# Patient Record
Sex: FEMALE | Race: BLACK OR AFRICAN AMERICAN | Employment: FULL TIME | URBAN - METROPOLITAN AREA
[De-identification: names, ages, dates, MRNs, and addresses within clinical notes are randomized per-mention and may not be internally consistent; named-entity substitution may affect disease eponyms.]

---

## 2019-01-10 ENCOUNTER — APPOINTMENT (OUTPATIENT)
Dept: GENERAL RADIOLOGY | Age: 39
End: 2019-01-10
Attending: EMERGENCY MEDICINE
Payer: COMMERCIAL

## 2019-01-10 ENCOUNTER — HOSPITAL ENCOUNTER (EMERGENCY)
Age: 39
Discharge: HOME | End: 2019-01-10
Attending: EMERGENCY MEDICINE | Admitting: EMERGENCY MEDICINE
Payer: COMMERCIAL

## 2019-01-10 VITALS
HEART RATE: 81 BPM | SYSTOLIC BLOOD PRESSURE: 128 MMHG | WEIGHT: 145 LBS | BODY MASS INDEX: 22.76 KG/M2 | OXYGEN SATURATION: 100 % | HEIGHT: 67 IN | RESPIRATION RATE: 16 BRPM | DIASTOLIC BLOOD PRESSURE: 74 MMHG | TEMPERATURE: 98.4 F

## 2019-01-10 DIAGNOSIS — R07.9 ACUTE CHEST PAIN: Primary | ICD-10-CM

## 2019-01-10 LAB
ALBUMIN SERPL-MCNC: 3.7 G/DL (ref 3.5–5)
ALBUMIN/GLOB SERPL: 0.8 {RATIO} (ref 1.1–2.2)
ALP SERPL-CCNC: 39 U/L (ref 45–117)
ALT SERPL-CCNC: 14 U/L (ref 12–78)
ANION GAP SERPL CALC-SCNC: 8 MMOL/L (ref 5–15)
AST SERPL-CCNC: 16 U/L (ref 15–37)
BASOPHILS # BLD: 0 K/UL (ref 0–0.1)
BASOPHILS NFR BLD: 1 % (ref 0–1)
BILIRUB SERPL-MCNC: 0.6 MG/DL (ref 0.2–1)
BUN SERPL-MCNC: 8 MG/DL (ref 6–20)
BUN/CREAT SERPL: 7 (ref 12–20)
CALCIUM SERPL-MCNC: 9.2 MG/DL (ref 8.5–10.1)
CHLORIDE SERPL-SCNC: 104 MMOL/L (ref 97–108)
CK SERPL-CCNC: 111 U/L (ref 26–192)
CO2 SERPL-SCNC: 26 MMOL/L (ref 21–32)
CREAT SERPL-MCNC: 1.14 MG/DL (ref 0.55–1.02)
D DIMER PPP FEU-MCNC: 0.19 MG/L FEU (ref 0–0.65)
DIFFERENTIAL METHOD BLD: ABNORMAL
EOSINOPHIL # BLD: 0 K/UL (ref 0–0.4)
EOSINOPHIL NFR BLD: 1 % (ref 0–7)
ERYTHROCYTE [DISTWIDTH] IN BLOOD BY AUTOMATED COUNT: 12.5 % (ref 11.5–14.5)
GLOBULIN SER CALC-MCNC: 4.4 G/DL (ref 2–4)
GLUCOSE SERPL-MCNC: 89 MG/DL (ref 65–100)
HCT VFR BLD AUTO: 39 % (ref 35–47)
HGB BLD-MCNC: 13 G/DL (ref 11.5–16)
IMM GRANULOCYTES # BLD AUTO: 0 K/UL (ref 0–0.04)
IMM GRANULOCYTES NFR BLD AUTO: 0 % (ref 0–0.5)
LYMPHOCYTES # BLD: 2.2 K/UL (ref 0.8–3.5)
LYMPHOCYTES NFR BLD: 59 % (ref 12–49)
MCH RBC QN AUTO: 30.1 PG (ref 26–34)
MCHC RBC AUTO-ENTMCNC: 33.3 G/DL (ref 30–36.5)
MCV RBC AUTO: 90.3 FL (ref 80–99)
MONOCYTES # BLD: 0.3 K/UL (ref 0–1)
MONOCYTES NFR BLD: 8 % (ref 5–13)
NEUTS SEG # BLD: 1.1 K/UL (ref 1.8–8)
NEUTS SEG NFR BLD: 31 % (ref 32–75)
NRBC # BLD: 0 K/UL (ref 0–0.01)
NRBC BLD-RTO: 0 PER 100 WBC
PLATELET # BLD AUTO: 257 K/UL (ref 150–400)
PMV BLD AUTO: 10.5 FL (ref 8.9–12.9)
POTASSIUM SERPL-SCNC: 4.3 MMOL/L (ref 3.5–5.1)
PROT SERPL-MCNC: 8.1 G/DL (ref 6.4–8.2)
RBC # BLD AUTO: 4.32 M/UL (ref 3.8–5.2)
SODIUM SERPL-SCNC: 138 MMOL/L (ref 136–145)
TROPONIN I SERPL-MCNC: <0.05 NG/ML
TROPONIN I SERPL-MCNC: <0.05 NG/ML
WBC # BLD AUTO: 3.7 K/UL (ref 3.6–11)

## 2019-01-10 PROCEDURE — 85379 FIBRIN DEGRADATION QUANT: CPT

## 2019-01-10 PROCEDURE — 93005 ELECTROCARDIOGRAM TRACING: CPT

## 2019-01-10 PROCEDURE — 99283 EMERGENCY DEPT VISIT LOW MDM: CPT

## 2019-01-10 PROCEDURE — 85025 COMPLETE CBC W/AUTO DIFF WBC: CPT

## 2019-01-10 PROCEDURE — 36415 COLL VENOUS BLD VENIPUNCTURE: CPT

## 2019-01-10 PROCEDURE — 74011250636 HC RX REV CODE- 250/636: Performed by: EMERGENCY MEDICINE

## 2019-01-10 PROCEDURE — 82550 ASSAY OF CK (CPK): CPT

## 2019-01-10 PROCEDURE — 84484 ASSAY OF TROPONIN QUANT: CPT

## 2019-01-10 PROCEDURE — 80053 COMPREHEN METABOLIC PANEL: CPT

## 2019-01-10 PROCEDURE — 96374 THER/PROPH/DIAG INJ IV PUSH: CPT

## 2019-01-10 PROCEDURE — 71046 X-RAY EXAM CHEST 2 VIEWS: CPT

## 2019-01-10 RX ORDER — LIDOCAINE 50 MG/G
PATCH TOPICAL
Qty: 7 EACH | Refills: 0 | Status: SHIPPED | OUTPATIENT
Start: 2019-01-10 | End: 2021-04-02

## 2019-01-10 RX ORDER — LORAZEPAM 2 MG/ML
0.5 INJECTION INTRAMUSCULAR
Status: COMPLETED | OUTPATIENT
Start: 2019-01-10 | End: 2019-01-10

## 2019-01-10 RX ORDER — CYCLOBENZAPRINE HCL 5 MG
10 TABLET ORAL
Qty: 20 TAB | Refills: 0 | Status: SHIPPED | OUTPATIENT
Start: 2019-01-10 | End: 2021-04-02

## 2019-01-10 RX ADMIN — LORAZEPAM 0.5 MG: 2 INJECTION INTRAMUSCULAR; INTRAVENOUS at 11:31

## 2019-01-10 NOTE — ED NOTES
Pt presents to the ED from triage c/o left sided chest pain onset 0830 today. Pt describes the pain as intermittent and sharp when the pain comes on. Pt denies any heavy lifting or change in daily routine. Pt denies sob, n/v, fever/chills, dizzy or any other complaints. Pt states pain is worse when moved a certain way or lifts arms

## 2019-01-10 NOTE — ED TRIAGE NOTES
Chest pain at 0830 post normal morning routine, Sitting makes worse, lifting left arm makes worse, SOB with pain, EMS on site and checked patient. On oral contraception.

## 2019-01-10 NOTE — LETTER
[address] 
[facility] EMERGENCY DEPT 
[address] [facility] [address] [id number] [phone] Work/School Note Date: 1/10/2019 To Whom It May concern: 
 
Camilo Houston was seen and treated today in the emergency room by the following provider(s): 
Attending Provider: Atul Coronel MD.   
 
Camilo Houston may return to work on 1/12/18.  
 
Sincerely, 
 
 
 
 
Nisa Metcalf MD

## 2019-01-10 NOTE — ED PROVIDER NOTES
41 y.o. female with no significant past medical history who presents from home with chief complaint of chest pain. Patient reports waking up feeling fine this morning and after taking a shower she has sudden onset of chest pain at 0830. Patient reports having intermittent, nonradiating left sided chest pain described as \"spasm\" that progressively worsened during the episode and rated as 8/10 pain, with accompanying SOB. Upon examination at [facility] ED patient endorses constant chest discomfort, but states the \"spasms\" have since resolved. Patient reports aggravation of left sided chest pain with pleuritic movement, \"moving a certain way\", and positional movement of the left arm. Patient on oral contraception. Patient reports history of chest pain described as \"spasm\" presented today, but states today's symptoms are more severe in nature than previously. Patient denies recent heavy lifting. Patient denies any chronic health issues. Pt denies fever, chills, cough, congestion, shortness of breath, abdominal pain, nausea, vomiting, diarrhea, difficulty with urination or dysuria. There are no other acute medical concerns at this time. Note written by Kenyetta Mccarty, as dictated by Earl Bo MD 9:49 AM 
 
 
 
 
  
 
History reviewed. No pertinent past medical history. Past Surgical History:  
Procedure Laterality Date  HX  SECTION    
 HX GYN History reviewed. No pertinent family history. Social History Socioeconomic History  Marital status: Not on file Spouse name: Not on file  Number of children: Not on file  Years of education: Not on file  Highest education level: Not on file Social Needs  Financial resource strain: Not on file  Food insecurity - worry: Not on file  Food insecurity - inability: Not on file  Transportation needs - medical: Not on file  Transportation needs - non-medical: Not on file Occupational History  Not on file Tobacco Use  Smoking status: Never Smoker  Smokeless tobacco: Never Used Substance and Sexual Activity  Alcohol use: No  
  Frequency: Never  Drug use: No  
 Sexual activity: Yes  
  Partners: Male Other Topics Concern  Not on file Social History Narrative  Not on file ALLERGIES: Patient has no known allergies. Review of Systems Constitutional: Negative for chills and fever. HENT: Negative for congestion, rhinorrhea and sore throat. Respiratory: Negative for cough and shortness of breath. Cardiovascular: Positive for chest pain. Gastrointestinal: Negative for abdominal pain, diarrhea, nausea and vomiting. Genitourinary: Negative for difficulty urinating, dysuria and urgency. Musculoskeletal: Negative for arthralgias and back pain. Skin: Negative for rash. Neurological: Negative for dizziness, weakness and light-headedness. All other systems reviewed and are negative. Vitals:  
 01/10/19 0142 BP: 122/79 Pulse: 82 Resp: 14 Temp: 98.4 °F (36.9 °C) SpO2: 100% Weight: 65.8 kg (145 lb) Height: 5' 7\" (1.702 m) Physical Exam  
Vital signs reviewed. Nursing notes reviewed. Const:  No acute distress, well developed, well nourished Head:  Atraumatic, normocephalic Eyes:  PERRL, conjunctiva normal, no scleral icterus Neck:  Supple, trachea midline Cardiovascular:  RRR, no murmurs, no gallops, no rubs Resp:  No resp distress, no increased work of breathing, no wheezes, no rhonchi, no rales, Abd:  Soft, non-tender, non-distended, no rebound, no guarding, no CVA tenderness :  Deferred MSK:  No pedal edema, normal ROM Neuro:  Alert and oriented x3, no cranial nerve defect Skin:  Warm, dry, intact Psych: normal mood and affect, behavior is normal, judgement and thought content is normal 
Note written by Kenyetta Luna, as dictated by Rachel Ray MD 9:50 AM 
 
MDM Number of Diagnoses or Management Options Acute chest pain:  
  
Amount and/or Complexity of Data Reviewed Clinical lab tests: ordered and reviewed Tests in the radiology section of CPT®: ordered and reviewed Review and summarize past medical records: yes Patient Progress Patient progress: stable Pt. Presents to the ER with sx consistent with MSK chest pain. Negative d-dimer. Negative cardiac enzymes. I will start her on flexeril and a lidoderm patch. Pt. To f/u with her PCP or return to the ER with worsening sx. Procedures

## 2019-01-10 NOTE — DISCHARGE INSTRUCTIONS

## 2019-01-11 LAB
ATRIAL RATE: 76 BPM
CALCULATED P AXIS, ECG09: 87 DEGREES
CALCULATED R AXIS, ECG10: 56 DEGREES
CALCULATED T AXIS, ECG11: 54 DEGREES
DIAGNOSIS, 93000: NORMAL
P-R INTERVAL, ECG05: 156 MS
Q-T INTERVAL, ECG07: 382 MS
QRS DURATION, ECG06: 84 MS
QTC CALCULATION (BEZET), ECG08: 429 MS
VENTRICULAR RATE, ECG03: 76 BPM

## 2021-04-02 ENCOUNTER — VIRTUAL VISIT (OUTPATIENT)
Dept: FAMILY MEDICINE CLINIC | Age: 41
End: 2021-04-02
Payer: COMMERCIAL

## 2021-04-02 DIAGNOSIS — K59.09 CHRONIC CONSTIPATION: ICD-10-CM

## 2021-04-02 DIAGNOSIS — R61 NIGHT SWEATS: ICD-10-CM

## 2021-04-02 DIAGNOSIS — K64.9 HEMORRHOIDS, UNSPECIFIED HEMORRHOID TYPE: ICD-10-CM

## 2021-04-02 DIAGNOSIS — H52.10 MYOPIA, UNSPECIFIED LATERALITY: ICD-10-CM

## 2021-04-02 DIAGNOSIS — Z76.89 ENCOUNTER TO ESTABLISH CARE: Primary | ICD-10-CM

## 2021-04-02 DIAGNOSIS — Z80.41 FAMILY HISTORY OF OVARIAN CANCER: ICD-10-CM

## 2021-04-02 PROCEDURE — 99203 OFFICE O/P NEW LOW 30 MIN: CPT | Performed by: FAMILY MEDICINE

## 2021-04-02 NOTE — PROGRESS NOTES
[id number] [address] Note      Assessment/ Plan:   Diagnoses and all orders for this visit:    1. Encounter to establish care    2. Night sweats    3. Family history of ovarian cancer    4. Chronic constipation    5. Hemorrhoids, unspecified hemorrhoid type    6. Myopia, unspecified laterality      Recommendations based on history, physical exam and review of pertinent labs, studies and medications:   Night sweats, subacute occurring more than 1 month nightly. Need in office examination with vitals. Next visit consider labs CBC, TSH, HIV, CRP, LFT, urinalysis, FSH  Family family history of ovarian cancer. Advised patient discuss early screening mammography with GYN and insurance. Chronic constipation with reported hemorrhoids. Diet modification recommended for increasing natural sources of fiber to promote regular bowel movements. May add OTC stool softener or laxative as needed. Follow up with specialists per routine. We discussed the expected course, resolution and complications of the diagnosis(es) in detail. Medication risks, benefits, costs, interactions, and alternatives were discussed as indicated. I advised her to contact the office if her condition worsens, changes or fails to improve as anticipated. She expressed understanding with the diagnosis(es) and plan. Thomas Woods is a 47 y.o. female being evaluated by a video visit encounter for concerns as above. A caregiver was present when appropriate. Due to this being a TeleHealth encounter (During ACEGU-60 public health emergency), evaluation of the following organ systems was limited: Vitals/Constitutional/EENT/Resp/CV/GI//MS/Neuro/Skin/Heme-Lymph-Imm.   Pursuant to the emergency declaration under the [facility], 1135 waiver authority and the [employer] and [employer] Act, this Virtual  Visit was conducted, with patient's (and/or legal guardian's) consent, to reduce the patient's risk of exposure to COVID-19 and provide necessary medical care. Services were provided through a video synchronous discussion virtually to substitute for in-person clinic visit. Provider was in the office while conducting this encounter. Patient was at home during encounter. Other persons participating in call: None  Consent:  She and/or her healthcare decision maker is aware that this patient-initiated Telehealth encounter is a billable service, with coverage as determined by her insurance carrier. She is aware that she may receive a bill and has provided verbal consent to proceed: Yes  This virtual visit was conducted via [employer]. Pursuant to the emergency declaration under the [facility], 1135 waiver authority and the [employer] and [employer] Act, this Virtual  Visit was conducted to reduce the patient's risk of exposure to COVID-19 and provide continuity of care for an established patient. Services were provided through a video synchronous discussion virtually to substitute for in-person clinic visit. Due to this being a TeleHealth evaluation, many elements of the physical examination are unable to be assessed. Total Time: 30     Follow-up and Dispositions    · Return in about 4 weeks (around 4/30/2021) for Follow Up in office for night sweats.        Subjective:     Chief Complaint   Patient presents with   [address] [place] [address] Angle Skates is a 47y.o. year old female who presents for evaluation of the following:      Family History of Cancer  Maternal Ovarian Caner age 47  Maternal aunt with breast cancer  No genetic testing done  Has had 1 mammogram in [place]    Night Sweats:   Onset > 1 monht  Character: shirt wet and needing to be changed  Nightly  Concern she may have thyroid issues  Endorses anxiety  Denies daytime sweating, heat intolerance, diarrhea, weight loss, rash, joint swelling     Constipation/ Hemorrhoids:  Chronic > 1 year  Diet: chicken, seafood, fish, shrimp, vegetables every meal, kidney bean  - Last 24 hours: chicken baked ziti (zitia pasta, tomatoes sauce, chicken breast, cheese). Seafood pasta (cheese shrimp janell, fettuccine, gallic bread)  Tx: fiber supplements     Nearsighted: Tx: wear glasses    Establishment of Care:  Patient Care Team:  None as PCP - General   Previous PCP: Name Unknown  GYN - [facility] [place]  Employment- works as  in [address]   Pertinent positives and negative per HPI. All other systems  reviewed are negative for a Comprehensive ROS (10+). Past medical history, social history, family history reviewed. Medications reconciled. Objective:   No flowsheet data found. Vitals measurement not available     Physical Examination:  General: Alert, cooperative, no distress, appears stated age. Eyes: Conjunctivae clear. Pupils equally round. Extraocular muscles intact. Ears: Normal appearing external ear   Nose: Nares normal appearing  Mouth/Throat: Lips, mucosa, and tongue normal. Moist mucous membranes. No tonsillar enlargement noted. Neck: Supple, symmetrical, trachea midline, no neck mass visualized. Respiratory: Breathing comfortably, in no acute respiratory distress. Cardiovascular: Visualized extremities without edema. MSK: Upper extremities normal appearing. Skin: No significant erythematous lesions or discoloration noted on facial skin. No rashes or lesions on exposed skin. Neurologic: No facial asymmetry. Normal gaze. Cranial nerves intact. Psychiatric: Affect appropriate. Mood euthymic. Thoughts logical. Speech volume and speed normal. No hallucinations. Well kempt.        Signed,    Holly Berman MD  4/2/2021

## 2021-04-02 NOTE — PATIENT INSTRUCTIONS
A Healthy Lifestyle: Care Instructions Your Care Instructions A healthy lifestyle can help you feel good, stay at a healthy weight, and have plenty of energy for both work and play. A healthy lifestyle is something you can share with your whole family. A healthy lifestyle also can lower your risk for serious health problems, such as high blood pressure, heart disease, and diabetes. You can follow a few steps listed below to improve your health and the health of your family. Follow-up care is a key part of your treatment and safety. Be sure to make and go to all appointments, and call your doctor if you are having problems. It's also a good idea to know your test results and keep a list of the medicines you take. How can you care for yourself at home? · Do not eat too much sugar, fat, or fast foods. You can still have dessert and treats now and then. The goal is moderation. · Start small to improve your eating habits. Pay attention to portion sizes, drink less juice and soda pop, and eat more fruits and vegetables. ? Eat a healthy amount of food. A 3-ounce serving of meat, for example, is about the size of a deck of cards. Fill the rest of your plate with vegetables and whole grains. ? Limit the amount of soda and sports drinks you have every day. Drink more water when you are thirsty. ? Eat plenty of fruits and vegetables every day. Have an apple or some carrot sticks as an afternoon snack instead of a candy bar. Try to have fruits and/or vegetables at every meal. 
· Make exercise part of your daily routine. You may want to start with simple activities, such as walking, bicycling, or slow swimming. Try to be active 30 to 60 minutes every day. You do not need to do all 30 to 60 minutes all at once. For example, you can exercise 3 times a day for 10 or 20 minutes.  Moderate exercise is safe for most people, but it is always a good idea to talk to your doctor before starting an exercise program. 
· Keep moving. Kiki Qureshi the lawn, work in the garden, or [employer] Take the stairs instead of the elevator at work. · If you smoke, quit. People who smoke have an increased risk for heart attack, stroke, cancer, and other lung illnesses. Quitting is hard, but there are ways to boost your chance of quitting tobacco for good. ? Use nicotine gum, patches, or lozenges. ? Ask your doctor about stop-smoking programs and medicines. ? Keep trying. In addition to reducing your risk of diseases in the future, you will notice some benefits soon after you stop using tobacco. If you have shortness of breath or asthma symptoms, they will likely get better within a few weeks after you quit. · Limit how much alcohol you drink. Moderate amounts of alcohol (up to 2 drinks a day for men, 1 drink a day for women) are okay. But drinking too much can lead to liver problems, high blood pressure, and other health problems. Family health If you have a family, there are many things you can do together to improve your health. · Eat meals together as a family as often as possible. · Eat healthy foods. This includes fruits, vegetables, lean meats and dairy, and whole grains. · Include your family in your fitness plan. Most people think of activities such as jogging or tennis as the way to fitness, but there are many ways you and your family can be more active. Anything that makes you breathe hard and gets your heart pumping is exercise. Here are some tips: 
? Walk to do errands or to take your child to school or the bus. 
? Go for a family bike ride after dinner instead of watching TV. Where can you learn more? Go to http://www.gray.com/ Enter E899 in the search box to learn more about \"A Healthy Lifestyle: Care Instructions. \" Current as of: September 23, 2020               Content Version: 12.8 © 0621-6112 [employer].   
Care instructions adapted under license by [employer] (which disclaims liability or warranty for this information). If you have questions about a medical condition or this instruction, always ask your healthcare professional. [address] any warranty or liability for your use of this information. Abnormal Sweating: Care Instructions Your Care Instructions Sweating is your body's way of cooling down and getting rid of some chemicals. But some people have a condition that makes them sweat too much. It can affect any part of your body, especially the head, armpits, hands, and feet. Sometimes the sweat mixes with bacteria on your skin and causes armpits and feet to smell bad. It can be upsetting to have sweat drip from your face and palms or to have smelly feet and shoes. Some people seem to be born with this condition, while some others may sweat too much because of anxiety. You may be able to reduce the amount you sweat by lowering stress in your life. Some people find that antiperspirants help, and you can take steps at home that will help with smelly feet. If you still have too much sweating, your doctor may recommend other treatments. Follow-up care is a key part of your treatment and safety. Be sure to make and go to all appointments, and call your doctor if you are having problems. It's also a good idea to know your test results and keep a list of the medicines you take. How can you care for yourself at home? · If your doctor prescribed medicine, use it as directed. Call your doctor if you have any problems with your medicine. You will get more details on the specific medicines your doctor prescribes. · Bathe 1 or 2 times a day with soap and water. Do not scrub your skin too much, because that can irritate it. Dry your skin well after bathing. · Use a deodorant with antiperspirant. It might help to put it on at night before bed. · Wear clothing made of material that lets your skin breathe.  Cotton, wool, silk, and linen are good choices. For exercising, wear material that removes (best) the moisture from your skin. · Keep an extra shirt at work or in a school locker. · Attach pads (underarm or dress shields) to the armpit area of clothing to absorb sweat. You can buy these pads in sports or clothing stores. · Let your shoes dry out for a day after wearing them. If possible, set them in a place where the sun will shine on them. That will help kill the bacteria that cause the smell. · Change your socks at least 1 time a day. Wash your socks after each wearing. · Use foot powder or talc in your shoes and socks and on your feet. Put inserts in your shoes to absorb some of the sweat. Go barefoot for a while each day to let your feet dry out. · Limit hot drinks, such as coffee and tea, which make you sweat more. When should you call for help? Watch closely for changes in your health, and be sure to contact your doctor if: 
  · You continue to sweat too much, and it bothers you. Where can you learn more? Go to http://www.gray.com/ Enter I152 in the search box to learn more about \"Abnormal Sweating: Care Instructions. \" Current as of: February 26, 2020               Content Version: 12.8 © 2006-2021 [employer]. Care instructions adapted under license by [employer] (which disclaims liability or warranty for this information). If you have questions about a medical condition or this instruction, always ask your healthcare professional. [place] [address] any warranty or liability for your use of this information.

## 2022-03-19 PROBLEM — K59.09 CHRONIC CONSTIPATION: Status: ACTIVE | Noted: 2021-04-02

## 2022-03-20 PROBLEM — Z80.41 FAMILY HISTORY OF OVARIAN CANCER: Status: ACTIVE | Noted: 2021-04-02

## 2023-08-22 ENCOUNTER — TELEPHONE (OUTPATIENT)
Age: 43
End: 2023-08-22

## 2023-08-22 NOTE — TELEPHONE ENCOUNTER
----- Message from [place] See sent at 8/22/2023  2:04 PM EDT -----  Subject: Appointment Request    Reason for Call: New Patient/New to Provider Appointment needed: Routine   Physical Exam    QUESTIONS    Reason for appointment request? Requested Provider unavailable - Dr. Chandu Martinez     Additional Information for Provider? Patient wants to know if Dr. Chandu Martinez would establish care with her. She would like to see her.    Please call to advise.   ---------------------------------------------------------------------------  --------------  Karoline ZAPATA  [phone]; OK to leave message on voicemail  ---------------------------------------------------------------------------  --------------  SCRIPT ANSWERS